# Patient Record
Sex: MALE | HISPANIC OR LATINO | Employment: PART TIME | URBAN - METROPOLITAN AREA
[De-identification: names, ages, dates, MRNs, and addresses within clinical notes are randomized per-mention and may not be internally consistent; named-entity substitution may affect disease eponyms.]

---
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
## 2023-05-19 ENCOUNTER — HOSPITAL ENCOUNTER (OUTPATIENT)
Dept: LAB | Facility: MEDICAL CENTER | Age: 18
End: 2023-05-19
Payer: MEDICAID

## 2023-07-20 ENCOUNTER — HOSPITAL ENCOUNTER (EMERGENCY)
Facility: MEDICAL CENTER | Age: 18
End: 2023-07-20
Attending: EMERGENCY MEDICINE
Payer: MEDICAID

## 2023-07-20 VITALS
WEIGHT: 111.99 LBS | HEIGHT: 66 IN | BODY MASS INDEX: 18 KG/M2 | TEMPERATURE: 97.8 F | SYSTOLIC BLOOD PRESSURE: 131 MMHG | RESPIRATION RATE: 18 BRPM | OXYGEN SATURATION: 98 % | DIASTOLIC BLOOD PRESSURE: 66 MMHG | HEART RATE: 69 BPM

## 2023-07-20 DIAGNOSIS — R45.851 SUICIDAL IDEATION: ICD-10-CM

## 2023-07-20 LAB
AMPHET UR QL SCN: NEGATIVE
BARBITURATES UR QL SCN: NEGATIVE
BENZODIAZ UR QL SCN: NEGATIVE
BZE UR QL SCN: NEGATIVE
CANNABINOIDS UR QL SCN: NEGATIVE
FENTANYL UR QL: NEGATIVE
METHADONE UR QL SCN: NEGATIVE
OPIATES UR QL SCN: NEGATIVE
OXYCODONE UR QL SCN: NEGATIVE
PCP UR QL SCN: NEGATIVE
POC BREATHALIZER: 0 PERCENT (ref 0–0.01)
PROPOXYPH UR QL SCN: NEGATIVE

## 2023-07-20 PROCEDURE — 99285 EMERGENCY DEPT VISIT HI MDM: CPT

## 2023-07-20 PROCEDURE — 302970 POC BREATHALIZER: Performed by: EMERGENCY MEDICINE

## 2023-07-20 PROCEDURE — 90791 PSYCH DIAGNOSTIC EVALUATION: CPT

## 2023-07-20 PROCEDURE — 80307 DRUG TEST PRSMV CHEM ANLYZR: CPT

## 2023-07-20 NOTE — ED PROVIDER NOTES
ED Provider Note    CHIEF COMPLAINT  Chief Complaint   Patient presents with    Suicidal Ideation    Other     Pt seend by [facility] for medical clearance.       EXTERNAL RECORDS REVIEWED  Other none    HPI/ROS  LIMITATION TO HISTORY   Select: Language Albanian,  Used   OUTSIDE HISTORIAN(S):  None    Adamaradha Farrar is a 18 y.o. male who presents with a chief complaint of suicidal ideation.  Patient reports he has a longstanding history of sadness and feeling isolated but has never tried to kill himself in the past.  He has been compliant on his prescribed psychiatric medications.  He has never been hospitalized in a psychiatric facility.  He went to a psychiatric facility earlier today and told them of his plan to kill himself and they placed him on a legal hold and sent him to the ER for evaluation.  Here he denies any plan to kill himself but states that he is sad. He denies any fevers or chills, chest pain or shortness of breath, abdominal pain.  He has no medical complaints.  He denies alcohol or drug use.    PAST MEDICAL HISTORY       SURGICAL HISTORY  patient denies any surgical history    FAMILY HISTORY  No family history on file.    SOCIAL HISTORY  Social History     Tobacco Use    Smoking status: Never    Smokeless tobacco: Never   Substance and Sexual Activity    Alcohol use: No    Drug use: No    Sexual activity: Not on file       CURRENT MEDICATIONS  Home Medications    **Home medications have not yet been reviewed for this encounter**         ALLERGIES  Not on File    PHYSICAL EXAM  VITAL SIGNS: /69   Pulse 69   Temp 37 °C (98.6 °F) (Temporal)   Resp 18   SpO2 97%    Physical Exam  Vitals and nursing note reviewed.   Constitutional:       Appearance: Normal appearance.   HENT:      Head: Normocephalic and atraumatic.      Right Ear: External ear normal.      Left Ear: External ear normal.      Nose: Nose normal.      Mouth/Throat:      Mouth: Mucous membranes are moist.       Pharynx: Oropharynx is clear.   Eyes:      Extraocular Movements: Extraocular movements intact.      Conjunctiva/sclera: Conjunctivae normal.      Pupils: Pupils are equal, round, and reactive to light.   Cardiovascular:      Rate and Rhythm: Normal rate and regular rhythm.      Pulses: Normal pulses.   Pulmonary:      Effort: Pulmonary effort is normal.      Breath sounds: Normal breath sounds.   Abdominal:      Palpations: Abdomen is soft.      Tenderness: There is no abdominal tenderness.   Musculoskeletal:         General: Normal range of motion.      Cervical back: Normal range of motion and neck supple.      Right lower leg: No edema.      Left lower leg: No edema.   Skin:     General: Skin is warm and dry.   Neurological:      General: No focal deficit present.      Mental Status: He is alert and oriented to person, place, and time.   Psychiatric:      Comments: Pleasant and cooperative       DIAGNOSTIC STUDIES / PROCEDURES  LABS  Results for orders placed or performed during the hospital encounter of 07/20/23   POC BREATHALIZER   Result Value Ref Range    POC Breathalizer 0.000 0.00 - 0.01 Percent     RADIOLOGY  Radiologist interpretation:   No orders to display     COURSE & MEDICAL DECISION MAKING    ED Observation Status? Yes; I am placing the patient in to an observation status due to a diagnostic uncertainty as well as therapeutic intensity. Patient placed in observation status at 3:35 PM, 7/20/2023.     Observation plan is as follows: Labs, consultation, likely transport to inpatient psychiatric facility    Upon Reevaluation, the patient's condition has: not improved; and will be escalated to hospitalization.    Patient discharged from ED Observation status at 4:30 PM (Time) 7/20/2023 (Date).     INITIAL ASSESSMENT, COURSE AND PLAN  Care Narrative: This is a 18-year-old male with underlying psychiatric disease who was sent here from an inpatient psychiatric facility for medical clearance after he  arrived and told them he was suicidal with a plan to either hang himself or shoot himself.  Here he is afebrile with normal vital signs, appears well-hydrated and nontoxic.  He has no physical complaints.  Does not appear to be intoxicated.  Notes that he does not have a plan but admits that he is sad. Breathalyzer is 0.  UDS was ordered per protocol.  Patient is already on a legal hold which I have certified.  He is medically clear and safe for likely transport to an inpatient psychiatric facility.  He will need to be seen by behavioral health and the consult order has already been placed.      Patient was seen by our alert team personnel and he now states he is not suicidal. He reported that he was feeling anxious which was the reason for his initial presentation. Alert team spoke with patient's grandmother, with whom he lives, and she would like him to be discharged as she feels he is safe. Patient was reevaluated and continues to rest comfortably. He has a psychiatrist with whom he will follow up tomorrow. He was discharged in good and stable condition with strict return precautions.    ADDITIONAL PROBLEM LIST  None  DISPOSITION AND DISCUSSIONS  I have discussed management of the patient with the following physicians and FEDE's: None    Discussion of management with other QHP or appropriate source(s): None     Escalation of care considered, and ultimately not performed:blood analysis and diagnostic imaging    Barriers to care at this time, including but not limited to:  None .     Decision tools and prescription drugs considered including, but not limited to: N/A.    FINAL DIAGNOSIS  1.  Suicidal ideation    Electronically signed by: Nick Carrasco M.D., 7/20/2023 3:35 PM

## 2023-07-20 NOTE — ED TRIAGE NOTES
18 y.o.  Male    Chief Complaint   Patient presents with    Suicidal Ideation    Other     Pt seend by [facility] for medical clearance.     Pt BIBA with above complaint.  Per EMS with Diagnosis ADHD and Developmental allergies. Pt on legal hold due to SI killing himself by hanging or cut his self.   On current medication:  Sertraline and Hydroxyzine.    No treatment given enroute.     Pt ambulatory back to room G29- [place] with steady gait. Pt placed into gown. Alert oriented not in any form of distress noted.  Charted up for ERP.     /69   Pulse 69   Temp 37 °C (98.6 °F) (Temporal)   Resp 18   SpO2 97%

## 2023-07-20 NOTE — ED NOTES
Anticoagulation episode resolved     Pt went to bathroom with sitter assisted. Able to ambulate with steady gait noted.

## 2023-07-21 NOTE — ED NOTES
Checked on bed, with unlabored respirations. No safety risk noted  Awake.   Sitter - 1:1; Continued safety precaution  Gurney in low position, side rail up for pt safety.   Will continue to monitor for any complications.

## 2023-07-21 NOTE — ED NOTES
Pt transfer to [place] [address], assisted by ER tech Angelo.  All belongings taken to lckr 4 (2 bags) with face sheet inside.    Medication brought by EMS sent to Pharmacy by ER tech.

## 2023-07-21 NOTE — DISCHARGE INSTRUCTIONS
You were seen in the ER for thoughts of suicide.  Thankfully you are feeling better now after resting in the ER.  Our psychiatry team is spoken with your grandma and you are now safe for discharge.  Please follow-up with your psychiatrist, Dr. Mckeon tomorrow.  Return to the ER or call 911 immediately with any new or worsening symptoms.

## 2023-07-21 NOTE — CONSULTS
"[facility]   TRIAGE ASSESSMENT    Name: Adama Farrar  MRN: [id number]  : 2005  Age: 18 y.o.  Date of assessment: 2023  PCP: Lesley Kaiser M.D.  Persons in attendance: Patient and grandmother   Patient Location: [facility]    CHIEF COMPLAINT/PRESENTING ISSUE (as stated by Patient): Patient is a 18 male BIB EMS from [facility] on legal hold for SI. Patient sitting in bed with grandmother in room. Smiling, AO x4, but appears anxious.Presents mildly mentally delayed. Consult done by  via secure and encrypted videoconferencing equipment. Patient reporting a history of anxiety and depression. He reports, he has see's Dr. Mckeon at [facility] since last March for anxiety and depression monthly. Is prescribed Zoloft and Hydroxyzine with good compliance. Today during intake he made states of SI and not feeling safe. He states he was feeling \"very anxious\" earlier today when he made those statements. Patient now denies any current SI or thoughts of self harm several times during consult. \"I feel a lot better\". Patient and grandmother both feel patient is safe to go home and will follow up with [facility] in am. Patient does not meet criteria for legal hold. Findings discussed with ERP and RN.   Chief Complaint   Patient presents with    Suicidal Ideation    Other     Pt seend by [facility] for medical clearance.        CURRENT LIVING SITUATION/SOCIAL SUPPORT/FINANCIAL RESOURCES: Patient lives with his aunt and grandmother. Currently not working or going to school    BEHAVIORAL HEALTH/SUBSTANCE USE TREATMENT HISTORY  Does patient/parent report a history of prior behavioral health/substance use treatment for patient?   Yes:    Dates Level of Care Facilty/Provider Diagnosis/Problem Medications   3/23 to current outpatient [facility] Anxiety, Depression Zoloft, Hydroxyzine       SAFETY ASSESSMENT - SELF  Does patient acknowledge current or past symptoms of " dangerousness to self or is previous history noted? yes  Does parent/significant other report patient has current or past symptoms of dangerousness to self? yes  Does presenting problem suggest symptoms of dangerousness to self? No    SAFETY ASSESSMENT - OTHERS  Does patient acknowledge current or past symptoms of aggressive behavior or risk to others or is previous history noted? no  Does parent/significant other report patient has current or past symptoms of aggressive behavior or risk to others?  no  Does presenting problem suggest symptoms of dangerousness to others? No    LEGAL HISTORY  Does patient acknowledge history of arrest/intermediate/FDC or is previous history noted? Not addressed    Crisis Safety Plan completed and copy given to patient? N\A    ABUSE/NEGLECT SCREENING  Does patient report feeling “unsafe” in his/her home, or afraid of anyone?  no  Does patient report any history of physical, sexual, or emotional abuse?  Not addressed  Does parent or significant other report any of the above? N\A  Is there evidence of neglect by self?  no  Is there evidence of neglect by a caregiver? no  Does the patient/parent report any history of CPS/APS/police involvement related to suspected abuse/neglect or domestic violence? no  Based on the information provided during the current assessment, is a mandated report of suspected abuse/neglect being made?  No    SUBSTANCE USE SCREENING  Yes:  [place] all substances used in the past 30 days: Denies any use       UDS results: Negative  Breathalyzer results: 0.0      MENTAL STATUS   Participation: Active verbal participation and Attentive  Grooming: Casual  Orientation: Alert and Fully Oriented  Behavior: Tense  Eye contact: Good  Mood: Anxious  Affect: Flexible and Full range  Thought process: Logical and Goal-directed  Thought content: Within normal limits  Speech: Rate within normal limits and Volume within normal limits  Perception: Within normal limits  Memory:  No gross  evidence of memory deficits  Insight: Adequate  Judgment:  Adequate  Other:    Collateral information:   Source:  [x] Grandmother present in person:   [] Significant other by telephone  [] [facility]   [x] [facility] Nursing Staff  [x] [facility]  [x] Other: ERP    [] Unable to complete full assessment due to:  [] Acute intoxication  [] Patient declined to participate/engage  [] Patient verbally unresponsive  [] Significant cognitive deficits  [] Significant perceptual distortions or behavioral disorganization  [] Other:      CLINICAL IMPRESSIONS:  Primary:  Anxiety   Secondary:  Depression       IDENTIFIED NEEDS/PLAN:  [Trigger DISPOSITION list for any items marked]    []  Imminent safety risk - self [] Imminent safety risk - others   []  Acute substance withdrawal []  Psychosis/Impaired reality testing   [x]  Mood/anxiety []  Substance use/Addictive behavior   []  Maladaptive behaviro []  Parent/child conflict   []  Family/Couples conflict []  Biomedical   []  Housing []  Financial   []   Legal  Occupational/Educational   []  Domestic violence []  Other:     Recommended Plan of Care:   Follow up with Dr. Mckeon at [facility] in .  *Telesitter may not be utilized for moderate or high risk patients    Has the Recommended Plan of Care/Level of Observation been reviewed with the patient's assigned nurse? yes    Does patient/parent or guardian express agreement with the above plan? yes      Referral appointment(s) scheduled? no    Alert team only:   I have discussed findings and recommendations with Dr. Carrasco who is in agreement with these recommendations.     Referral information sent to the following outpatient community providers :    Referral information sent to the following inpatient community providers :    If applicable : Referred  to  Alert Team for legal hold follow up at (time): ADELFO Dangelo R.N.  7/20/2023

## 2023-07-21 NOTE — ED NOTES
Pt discharged to home. Discharge paperwork provided. Education provided by ERP.  Pt was given follow up instructions. Pt verbalized understanding of all instructions for discharge. Pt and family waiting for his medication kept in pharmacy.   All Pt belongings given in locker 4.    Yes

## 2023-07-21 NOTE — ED NOTES
Pt given his medications from pharmacy. Patient ambulatory, alert and oriented x 4, out of ER with all belongings and steady gait.

## 2024-09-30 ENCOUNTER — HOSPITAL ENCOUNTER (OUTPATIENT)
Dept: LAB | Facility: MEDICAL CENTER | Age: 19
End: 2024-09-30
Attending: STUDENT IN AN ORGANIZED HEALTH CARE EDUCATION/TRAINING PROGRAM
Payer: MEDICAID

## 2024-09-30 LAB
ALBUMIN SERPL BCP-MCNC: 4.6 G/DL (ref 3.2–4.9)
ALBUMIN/GLOB SERPL: 1.3 G/DL
ALP SERPL-CCNC: 148 U/L (ref 30–99)
ALT SERPL-CCNC: 14 U/L (ref 2–50)
ANION GAP SERPL CALC-SCNC: 11 MMOL/L (ref 7–16)
AST SERPL-CCNC: 21 U/L (ref 12–45)
BASOPHILS # BLD AUTO: 0.6 % (ref 0–1.8)
BASOPHILS # BLD: 0.04 K/UL (ref 0–0.12)
BILIRUB SERPL-MCNC: 0.6 MG/DL (ref 0.1–1.5)
BUN SERPL-MCNC: 19 MG/DL (ref 8–22)
CALCIUM ALBUM COR SERPL-MCNC: 9.4 MG/DL (ref 8.5–10.5)
CALCIUM SERPL-MCNC: 9.9 MG/DL (ref 8.5–10.5)
CHLORIDE SERPL-SCNC: 104 MMOL/L (ref 96–112)
CO2 SERPL-SCNC: 24 MMOL/L (ref 20–33)
CREAT SERPL-MCNC: 0.69 MG/DL (ref 0.5–1.4)
EOSINOPHIL # BLD AUTO: 0.11 K/UL (ref 0–0.51)
EOSINOPHIL NFR BLD: 1.7 % (ref 0–6.9)
ERYTHROCYTE [DISTWIDTH] IN BLOOD BY AUTOMATED COUNT: 44.7 FL (ref 35.9–50)
GFR SERPLBLD CREATININE-BSD FMLA CKD-EPI: 135 ML/MIN/1.73 M 2
GLOBULIN SER CALC-MCNC: 3.6 G/DL (ref 1.9–3.5)
GLUCOSE SERPL-MCNC: 92 MG/DL (ref 65–99)
HCT VFR BLD AUTO: 50.8 % (ref 42–52)
HGB BLD-MCNC: 17 G/DL (ref 14–18)
IMM GRANULOCYTES # BLD AUTO: 0.02 K/UL (ref 0–0.11)
IMM GRANULOCYTES NFR BLD AUTO: 0.3 % (ref 0–0.9)
LIPASE SERPL-CCNC: 20 U/L (ref 11–82)
LYMPHOCYTES # BLD AUTO: 2.01 K/UL (ref 1–4.8)
LYMPHOCYTES NFR BLD: 30.3 % (ref 22–41)
MCH RBC QN AUTO: 31.2 PG (ref 27–33)
MCHC RBC AUTO-ENTMCNC: 33.5 G/DL (ref 32.3–36.5)
MCV RBC AUTO: 93.2 FL (ref 81.4–97.8)
MONOCYTES # BLD AUTO: 0.48 K/UL (ref 0–0.85)
MONOCYTES NFR BLD AUTO: 7.2 % (ref 0–13.4)
NEUTROPHILS # BLD AUTO: 3.98 K/UL (ref 1.82–7.42)
NEUTROPHILS NFR BLD: 59.9 % (ref 44–72)
NRBC # BLD AUTO: 0 K/UL
NRBC BLD-RTO: 0 /100 WBC (ref 0–0.2)
PLATELET # BLD AUTO: 263 K/UL (ref 164–446)
PMV BLD AUTO: 9.8 FL (ref 9–12.9)
POTASSIUM SERPL-SCNC: 4.6 MMOL/L (ref 3.6–5.5)
PROT SERPL-MCNC: 8.2 G/DL (ref 6–8.2)
RBC # BLD AUTO: 5.45 M/UL (ref 4.7–6.1)
SODIUM SERPL-SCNC: 139 MMOL/L (ref 135–145)
TSH SERPL DL<=0.005 MIU/L-ACNC: 2.01 UIU/ML (ref 0.38–5.33)
UREA BREATH TEST QL: NEGATIVE
WBC # BLD AUTO: 6.6 K/UL (ref 4.8–10.8)

## 2024-09-30 PROCEDURE — 84443 ASSAY THYROID STIM HORMONE: CPT

## 2024-09-30 PROCEDURE — 83013 H PYLORI (C-13) BREATH: CPT

## 2024-09-30 PROCEDURE — 85025 COMPLETE CBC W/AUTO DIFF WBC: CPT

## 2024-09-30 PROCEDURE — 83690 ASSAY OF LIPASE: CPT

## 2024-09-30 PROCEDURE — 36415 COLL VENOUS BLD VENIPUNCTURE: CPT

## 2024-09-30 PROCEDURE — 80053 COMPREHEN METABOLIC PANEL: CPT
